# Patient Record
Sex: FEMALE | Race: WHITE | NOT HISPANIC OR LATINO | Employment: FULL TIME | URBAN - METROPOLITAN AREA
[De-identification: names, ages, dates, MRNs, and addresses within clinical notes are randomized per-mention and may not be internally consistent; named-entity substitution may affect disease eponyms.]

---

## 2021-05-26 ENCOUNTER — RECORDS - HEALTHEAST (OUTPATIENT)
Dept: ADMINISTRATIVE | Facility: CLINIC | Age: 31
End: 2021-05-26

## 2021-05-27 ENCOUNTER — RECORDS - HEALTHEAST (OUTPATIENT)
Dept: ADMINISTRATIVE | Facility: CLINIC | Age: 31
End: 2021-05-27

## 2021-05-28 ENCOUNTER — RECORDS - HEALTHEAST (OUTPATIENT)
Dept: ADMINISTRATIVE | Facility: CLINIC | Age: 31
End: 2021-05-28

## 2021-05-29 ENCOUNTER — RECORDS - HEALTHEAST (OUTPATIENT)
Dept: ADMINISTRATIVE | Facility: CLINIC | Age: 31
End: 2021-05-29

## 2021-05-31 ENCOUNTER — RECORDS - HEALTHEAST (OUTPATIENT)
Dept: ADMINISTRATIVE | Facility: CLINIC | Age: 31
End: 2021-05-31

## 2021-06-01 ENCOUNTER — RECORDS - HEALTHEAST (OUTPATIENT)
Dept: ADMINISTRATIVE | Facility: CLINIC | Age: 31
End: 2021-06-01

## 2023-02-01 ENCOUNTER — OFFICE VISIT (OUTPATIENT)
Dept: URGENT CARE | Facility: URGENT CARE | Age: 33
End: 2023-02-01
Payer: COMMERCIAL

## 2023-02-01 VITALS
TEMPERATURE: 98.1 F | HEART RATE: 120 BPM | WEIGHT: 150 LBS | DIASTOLIC BLOOD PRESSURE: 77 MMHG | RESPIRATION RATE: 16 BRPM | OXYGEN SATURATION: 98 % | SYSTOLIC BLOOD PRESSURE: 126 MMHG

## 2023-02-01 DIAGNOSIS — R63.1 ALWAYS THIRSTY: ICD-10-CM

## 2023-02-01 DIAGNOSIS — R00.0 TACHYCARDIA: ICD-10-CM

## 2023-02-01 DIAGNOSIS — R20.8 WARMNESS: ICD-10-CM

## 2023-02-01 DIAGNOSIS — R25.1 SHAKINESS: Primary | ICD-10-CM

## 2023-02-01 LAB
BASOPHILS # BLD AUTO: 0 10E3/UL (ref 0–0.2)
BASOPHILS NFR BLD AUTO: 0 %
EOSINOPHIL # BLD AUTO: 0 10E3/UL (ref 0–0.7)
EOSINOPHIL NFR BLD AUTO: 0 %
ERYTHROCYTE [DISTWIDTH] IN BLOOD BY AUTOMATED COUNT: 11.9 % (ref 10–15)
GLUCOSE BLD-MCNC: 111 MG/DL (ref 60–99)
HCT VFR BLD AUTO: 39.6 % (ref 35–47)
HGB BLD-MCNC: 13.6 G/DL (ref 11.7–15.7)
IMM GRANULOCYTES # BLD: 0 10E3/UL
IMM GRANULOCYTES NFR BLD: 0 %
LYMPHOCYTES # BLD AUTO: 1.1 10E3/UL (ref 0.8–5.3)
LYMPHOCYTES NFR BLD AUTO: 15 %
MCH RBC QN AUTO: 31.9 PG (ref 26.5–33)
MCHC RBC AUTO-ENTMCNC: 34.3 G/DL (ref 31.5–36.5)
MCV RBC AUTO: 93 FL (ref 78–100)
MONOCYTES # BLD AUTO: 0.4 10E3/UL (ref 0–1.3)
MONOCYTES NFR BLD AUTO: 5 %
NEUTROPHILS # BLD AUTO: 5.4 10E3/UL (ref 1.6–8.3)
NEUTROPHILS NFR BLD AUTO: 79 %
PLATELET # BLD AUTO: 219 10E3/UL (ref 150–450)
RBC # BLD AUTO: 4.27 10E6/UL (ref 3.8–5.2)
WBC # BLD AUTO: 6.8 10E3/UL (ref 4–11)

## 2023-02-01 PROCEDURE — 82947 ASSAY GLUCOSE BLOOD QUANT: CPT | Mod: 59 | Performed by: INTERNAL MEDICINE

## 2023-02-01 PROCEDURE — 85025 COMPLETE CBC W/AUTO DIFF WBC: CPT | Performed by: INTERNAL MEDICINE

## 2023-02-01 PROCEDURE — 36415 COLL VENOUS BLD VENIPUNCTURE: CPT | Performed by: INTERNAL MEDICINE

## 2023-02-01 PROCEDURE — 99204 OFFICE O/P NEW MOD 45 MIN: CPT | Performed by: INTERNAL MEDICINE

## 2023-02-01 PROCEDURE — 84443 ASSAY THYROID STIM HORMONE: CPT | Performed by: INTERNAL MEDICINE

## 2023-02-01 PROCEDURE — 93000 ELECTROCARDIOGRAM COMPLETE: CPT | Performed by: INTERNAL MEDICINE

## 2023-02-01 PROCEDURE — 80048 BASIC METABOLIC PNL TOTAL CA: CPT | Performed by: INTERNAL MEDICINE

## 2023-02-01 ASSESSMENT — ENCOUNTER SYMPTOMS
DIAPHORESIS: 0
RHINORRHEA: 0
CHEST TIGHTNESS: 1
ABDOMINAL PAIN: 0
FATIGUE: 0
ENDOCRINE COMMENTS: DRY MOUTH
VOMITING: 0
SPEECH DIFFICULTY: 0
FACIAL ASYMMETRY: 0
CHILLS: 0
PARESTHESIAS: 0
COUGH: 0
WHEEZING: 0
MYALGIAS: 0
SORE THROAT: 0
FREQUENCY: 0
BACK PAIN: 0
ARTHRALGIAS: 0
NAUSEA: 0
SHORTNESS OF BREATH: 0
WEAKNESS: 0
NUMBNESS: 0
HEADACHES: 0
FEVER: 0

## 2023-02-01 NOTE — PATIENT INSTRUCTIONS
Exam is remarkable for fast pulse but then normal with EKG    Otherwise heart lung and neurological exam is normal    EKG -heart rate 88.  Normal rhythm    Check pulse if concerned elevated    Blood counts -screen for inflammation or infection.  normal   Blood sugar -screening for diabetes.  Slightly elevated but this is a nonfasting specimen.  This is not consistent with diabetes symptoms    Electrolytes, kidney test, screening for dehydration -pending  TSH thyroid test screening for abnormal thyroid which could cause symptoms -pending  [facility]    Avoid caffeine and alcohol.  Avoid stimulants.  No Sudafed and so forth.  Be careful with cold medication  Good sleep    Work out    Reduce stress in life.  Symptoms potentially could be worrying about symptoms and developing anxiety    If symptoms are worsening, then I would like you to go to ER .  If heart rate is 130-140 then you need to go to the ER.  Otherwise I would like you to follow-up with your primary clinic in 1 to 2 weeks        Component      Latest Ref Rng & Units 2/1/2023   WBC      4.0 - 11.0 10e3/uL 6.8   RBC Count      3.80 - 5.20 10e6/uL 4.27   Hemoglobin      11.7 - 15.7 g/dL 13.6   Hematocrit      35.0 - 47.0 % 39.6   MCV      78 - 100 fL 93   MCH      26.5 - 33.0 pg 31.9   MCHC      31.5 - 36.5 g/dL 34.3   RDW      10.0 - 15.0 % 11.9   Platelet Count      150 - 450 10e3/uL 219   % Neutrophils      % 79   % Lymphocytes      % 15   % Monocytes      % 5   % Eosinophils      % 0   % Basophils      % 0   % Immature Granulocytes      % 0   Absolute Neutrophils      1.6 - 8.3 10e3/uL 5.4   Absolute Lymphocytes      0.8 - 5.3 10e3/uL 1.1   Absolute Monocytes      0.0 - 1.3 10e3/uL 0.4   Absolute Eosinophils      0.0 - 0.7 10e3/uL 0.0   Absolute Basophils      0.0 - 0.2 10e3/uL 0.0   Absolute Immature Granulocytes      <=0.4 10e3/uL 0.0   Glucose      60 - 99 mg/dL 111 (H)

## 2023-02-01 NOTE — PROGRESS NOTES
ASSESSMENT AND PLAN:      ICD-10-CM    1. Shakiness  R25.1 CBC with platelets and differential     Basic metabolic panel  (Ca, Cl, CO2, Creat, Gluc, K, Na, BUN)     TSH with free T4 reflex     EKG 12-lead complete w/read - Clinics     CBC with platelets and differential     Basic metabolic panel  (Ca, Cl, CO2, Creat, Gluc, K, Na, BUN)     TSH with free T4 reflex      2. Warmness  R20.8 CBC with platelets and differential     Basic metabolic panel  (Ca, Cl, CO2, Creat, Gluc, K, Na, BUN)     TSH with free T4 reflex     EKG 12-lead complete w/read - Clinics     CBC with platelets and differential     Basic metabolic panel  (Ca, Cl, CO2, Creat, Gluc, K, Na, BUN)     TSH with free T4 reflex      3. Tachycardia  R00.0 EKG 12-lead complete w/read - Clinics      4. Always thirsty  R63.1 Basic metabolic panel  (Ca, Cl, CO2, Creat, Gluc, K, Na, BUN)     TSH with free T4 reflex     Glucose, whole blood     Basic metabolic panel  (Ca, Cl, CO2, Creat, Gluc, K, Na, BUN)     TSH with free T4 reflex     Glucose, whole blood        Episodes of tachycardia, warmth shakiness.  Screen for dehydration, anemia, diabetes, thyroid disease.  EKG performed.  No ischemic changes noted.  No arrhythmia noted.    Consideration for pulmonary emboli given but felt low probability  Wells criteria is low probability  Score 0 as HR 88 on on EKG nonsustained.   over 2 months ago.    Consideration given to order troponin and D-dimer although results would not come back prior to end of clinic even if ordered stat.  Patient considered low probability otherwise for ischemia and blood clot.    PLAN:      Patient Instructions     Exam is remarkable for fast pulse but then normal with EKG    Otherwise heart lung and neurological exam is normal    EKG -heart rate 88.  Normal rhythm    Check pulse if concerned elevated    Blood counts -screen for inflammation or infection.  normal   Blood sugar -screening for diabetes.  Slightly elevated but this is a  nonfasting specimen.  This is not consistent with diabetes symptoms    Electrolytes, kidney test, screening for dehydration -pending  TSH thyroid test screening for abnormal thyroid which could cause symptoms -pending  You will receive these results on MyChart    Avoid caffeine and alcohol.  Avoid stimulants.  No Sudafed and so forth.  Be careful with cold medication  Good sleep    Work out    Reduce stress in life.  Symptoms potentially could be worrying about symptoms and developing anxiety    If symptoms are worsening, then I would like you to go to ER .  If heart rate is 130-140 then you need to go to the ER.  Otherwise I would like you to follow-up with your primary clinic in 1 to 2 weeks        Component      Latest Ref Rng & Units 2/1/2023   WBC      4.0 - 11.0 10e3/uL 6.8   RBC Count      3.80 - 5.20 10e6/uL 4.27   Hemoglobin      11.7 - 15.7 g/dL 13.6   Hematocrit      35.0 - 47.0 % 39.6   MCV      78 - 100 fL 93   MCH      26.5 - 33.0 pg 31.9   MCHC      31.5 - 36.5 g/dL 34.3   RDW      10.0 - 15.0 % 11.9   Platelet Count      150 - 450 10e3/uL 219   % Neutrophils      % 79   % Lymphocytes      % 15   % Monocytes      % 5   % Eosinophils      % 0   % Basophils      % 0   % Immature Granulocytes      % 0   Absolute Neutrophils      1.6 - 8.3 10e3/uL 5.4   Absolute Lymphocytes      0.8 - 5.3 10e3/uL 1.1   Absolute Monocytes      0.0 - 1.3 10e3/uL 0.4   Absolute Eosinophils      0.0 - 0.7 10e3/uL 0.0   Absolute Basophils      0.0 - 0.2 10e3/uL 0.0   Absolute Immature Granulocytes      <=0.4 10e3/uL 0.0   Glucose      60 - 99 mg/dL 111 (H)     Return in about 2 weeks (around 2/15/2023).        Christen Novoa MD  [facility]    Subjective     She Chambers is a 32 year old who presents for Patient presents with:  Urgent Care: X1 week Chest, arm hot and shaking, anxiety. Occurred three time last week.      a new patient of [facility].    3 episodes past week  Usually in  afternoon  Last few hours.  No triggers.  Chest, back & arms area feels hot  Also noticed before visit hands felt cold while rest of her feels heart.  Feels shaky  Hearts racing, thinks could be related to anxiety  Dry mouth    Treatment measures tried include: nothing  Significant past medical history:     Nov over 2 months ago, had   On OCP, hormones could be adjusting    Works out in the morning, no symptoms with     Fhx .mother - thyroid  BP pills  She is a non-smoker.   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  Restarting masters degree.  New boss.    Review of Systems   Constitutional: Negative for chills, diaphoresis, fatigue and fever.   HENT: Negative for ear pain, rhinorrhea and sore throat.    Eyes: Negative for visual disturbance.   Respiratory: Positive for chest tightness. Negative for cough, shortness of breath and wheezing.    Cardiovascular: Negative for chest pain and peripheral edema.   Gastrointestinal: Negative for abdominal pain, nausea and vomiting.        More loose stools this past week   Endocrine:        Dry mouth   Genitourinary: Negative.  Negative for frequency.   Musculoskeletal: Negative for arthralgias, back pain and myalgias.   Skin: Negative for rash.        Blotchy in face one of the times.  Had 2 beers.  Usually not with drinking   Neurological: Negative for facial asymmetry, speech difficulty, weakness, numbness, headaches and paresthesias.           Objective    /77   Pulse 120   Temp 98.1  F (36.7  C) (Oral)   Resp 16   Wt 68 kg (150 lb)   SpO2 98%   Physical Exam  Vitals reviewed.   Constitutional:       Appearance: Normal appearance. She is not ill-appearing or toxic-appearing.   HENT:      Right Ear: Tympanic membrane normal.      Left Ear: Tympanic membrane normal.      Nose: Nose normal.      Mouth/Throat:      Mouth: Mucous membranes are moist.      Pharynx: Oropharynx is clear.   Eyes:      Extraocular Movements:  Extraocular movements intact.      Conjunctiva/sclera: Conjunctivae normal.      Pupils: Pupils are equal, round, and reactive to light.   Neck:      Comments: Thyroid exam appears normal.  No nodules  Cardiovascular:      Rate and Rhythm: Regular rhythm. Tachycardia present.      Pulses: Normal pulses.      Heart sounds: Normal heart sounds.   Pulmonary:      Effort: Pulmonary effort is normal.      Breath sounds: Normal breath sounds.   Abdominal:      Palpations: Abdomen is soft.      Tenderness: There is no abdominal tenderness.   Musculoskeletal:      Right lower leg: No edema.      Left lower leg: No edema.   Skin:     Findings: No rash.   Neurological:      General: No focal deficit present.      Mental Status: She is alert and oriented to person, place, and time.      Cranial Nerves: No cranial nerve deficit.   Psychiatric:         Behavior: Behavior normal.         Thought Content: Thought content normal.         Judgment: Judgment normal.      Comments: Appears worried            EKG - Reviewed and interpreted by me appears normal, NSR, normal intervals, no acute ST/T changes c/w ischemia, no LVH by voltage criteria  Results for orders placed or performed in visit on 02/01/23 (from the past 24 hour(s))   CBC with platelets and differential    Narrative    The following orders were created for panel order CBC with platelets and differential.  Procedure                               Abnormality         Status                     ---------                               -----------         ------                     CBC with platelets and d...[[id number]]                      Final result                 Please view results for these tests on the individual orders.   Glucose, whole blood   Result Value Ref Range    Glucose Whole Blood 111 (H) 60 - 99 mg/dL   CBC with platelets and differential   Result Value Ref Range    WBC Count 6.8 4.0 - 11.0 10e3/uL    RBC Count 4.27 3.80 - 5.20 10e6/uL    Hemoglobin 13.6  11.7 - 15.7 g/dL    Hematocrit 39.6 35.0 - 47.0 %    MCV 93 78 - 100 fL    MCH 31.9 26.5 - 33.0 pg    MCHC 34.3 31.5 - 36.5 g/dL    RDW 11.9 10.0 - 15.0 %    Platelet Count 219 150 - 450 10e3/uL    % Neutrophils 79 %    % Lymphocytes 15 %    % Monocytes 5 %    % Eosinophils 0 %    % Basophils 0 %    % Immature Granulocytes 0 %    Absolute Neutrophils 5.4 1.6 - 8.3 10e3/uL    Absolute Lymphocytes 1.1 0.8 - 5.3 10e3/uL    Absolute Monocytes 0.4 0.0 - 1.3 10e3/uL    Absolute Eosinophils 0.0 0.0 - 0.7 10e3/uL    Absolute Basophils 0.0 0.0 - 0.2 10e3/uL    Absolute Immature Granulocytes 0.0 <=0.4 10e3/uL

## 2023-02-02 LAB
ANION GAP SERPL CALCULATED.3IONS-SCNC: 15 MMOL/L (ref 7–15)
BUN SERPL-MCNC: 14.8 MG/DL (ref 6–20)
CALCIUM SERPL-MCNC: 9.6 MG/DL (ref 8.6–10)
CHLORIDE SERPL-SCNC: 104 MMOL/L (ref 98–107)
CREAT SERPL-MCNC: 0.91 MG/DL (ref 0.51–0.95)
DEPRECATED HCO3 PLAS-SCNC: 21 MMOL/L (ref 22–29)
GFR SERPL CREATININE-BSD FRML MDRD: 86 ML/MIN/1.73M2
GLUCOSE SERPL-MCNC: 104 MG/DL (ref 70–99)
POTASSIUM SERPL-SCNC: 3.7 MMOL/L (ref 3.4–5.3)
SODIUM SERPL-SCNC: 140 MMOL/L (ref 136–145)
TSH SERPL DL<=0.005 MIU/L-ACNC: 2.66 UIU/ML (ref 0.3–4.2)

## 2023-02-04 ENCOUNTER — HEALTH MAINTENANCE LETTER (OUTPATIENT)
Age: 33
End: 2023-02-04

## 2024-03-03 ENCOUNTER — HEALTH MAINTENANCE LETTER (OUTPATIENT)
Age: 34
End: 2024-03-03

## 2025-03-15 ENCOUNTER — HEALTH MAINTENANCE LETTER (OUTPATIENT)
Age: 35
End: 2025-03-15